# Patient Record
Sex: MALE | Race: WHITE
[De-identification: names, ages, dates, MRNs, and addresses within clinical notes are randomized per-mention and may not be internally consistent; named-entity substitution may affect disease eponyms.]

---

## 2017-12-28 ENCOUNTER — HOSPITAL ENCOUNTER (OUTPATIENT)
Dept: HOSPITAL 72 - GAS | Age: 57
Discharge: HOME | End: 2017-12-28
Payer: COMMERCIAL

## 2017-12-28 VITALS — DIASTOLIC BLOOD PRESSURE: 87 MMHG | SYSTOLIC BLOOD PRESSURE: 132 MMHG

## 2017-12-28 VITALS — DIASTOLIC BLOOD PRESSURE: 85 MMHG | SYSTOLIC BLOOD PRESSURE: 122 MMHG

## 2017-12-28 VITALS — SYSTOLIC BLOOD PRESSURE: 126 MMHG | DIASTOLIC BLOOD PRESSURE: 88 MMHG

## 2017-12-28 VITALS — HEIGHT: 69 IN | BODY MASS INDEX: 29.62 KG/M2 | WEIGHT: 200 LBS

## 2017-12-28 VITALS — SYSTOLIC BLOOD PRESSURE: 116 MMHG | DIASTOLIC BLOOD PRESSURE: 80 MMHG

## 2017-12-28 VITALS — SYSTOLIC BLOOD PRESSURE: 114 MMHG | DIASTOLIC BLOOD PRESSURE: 88 MMHG

## 2017-12-28 VITALS — DIASTOLIC BLOOD PRESSURE: 82 MMHG | SYSTOLIC BLOOD PRESSURE: 126 MMHG

## 2017-12-28 VITALS — SYSTOLIC BLOOD PRESSURE: 128 MMHG | DIASTOLIC BLOOD PRESSURE: 92 MMHG

## 2017-12-28 VITALS — DIASTOLIC BLOOD PRESSURE: 77 MMHG | SYSTOLIC BLOOD PRESSURE: 118 MMHG

## 2017-12-28 DIAGNOSIS — Z90.79: ICD-10-CM

## 2017-12-28 DIAGNOSIS — K29.50: ICD-10-CM

## 2017-12-28 DIAGNOSIS — K21.9: ICD-10-CM

## 2017-12-28 DIAGNOSIS — Z85.46: ICD-10-CM

## 2017-12-28 DIAGNOSIS — I10: ICD-10-CM

## 2017-12-28 DIAGNOSIS — R10.9: Primary | ICD-10-CM

## 2017-12-28 PROCEDURE — 94003 VENT MGMT INPAT SUBQ DAY: CPT

## 2017-12-28 PROCEDURE — 43239 EGD BIOPSY SINGLE/MULTIPLE: CPT

## 2017-12-28 PROCEDURE — 94150 VITAL CAPACITY TEST: CPT

## 2017-12-28 NOTE — 48 HOUR POST ANESTHESIA EVAL
Post Anesthesia Evaluation


Procedure:  EGD


Date of Evaluation:  Dec 28, 2017


Time of Evaluation:  10:04


Blood Pressure Systolic:  131


0:  76


Pulse Rate:  78


Respiratory Rate:  18


Temperature (Fahrenheit):  98


O2 Sat by Pulse Oximetry:  99


Airway:  patent


Nausea:  No


Vomiting:  No


Pain Intensity:  1


Hydration Status:  adequate


Cardiopulmonary Status:


Stable


Mental Status/LOC:  patient returned to baseline


Follow-up Care/Observations:


0


Post-Anesthesia Complications:


0


Follow-up care needed:  ready to discharge











Milton Hackett MD Dec 28, 2017 07:15

## 2017-12-28 NOTE — PRE-PROCEDURE NOTE/ATTESTATION
Pre-Procedure Note/Attestation


Complete Prior to Procedure


Planned Procedure:  not applicable


Procedure Narrative:


EGD





Indications for Procedure


Pre-Operative Diagnosis:


abd pain





Attestation


I attest that I discussed the nature of the procedure; its benefits; risks and 

complications; and alternatives (and the risks and benefits of such alternatives

), prior to the procedure, with the patient (or the patient's legal 

representative).





I attest that, if there was a reasonable possibility of needing a blood 

transfusion, the patient (or the patient's legal representative) was given the 

[facility] standardized written summary, pursuant 

to the Hermann Varinder Blood Safety Act ([place] Health and Safety Code # 1645, as 

amended).





I attest that I re-evaluated the patient just prior to the surgery and that 

there has been no change in the patient's H&P, except as documented below:











THELMA CHUA Dec 28, 2017 07:15

## 2017-12-28 NOTE — BRIEF OPERATIVE NOTE
Immediate Post Operative Note


Operative Note


Chief Complaint:  abd pain


Pre-op Diagnosis:


abd pain


Procedure:


EGD/bx


Post-op Diagnosis:


normal


Surgeon:  ana laura


Anesthesiologist:  see report


Anesthesia:  moderate sedation


Specimen:  yes


Complications:  none


Condition:  stable


Fluids:  recorded


Estimated Blood Loss:  none


Drains:  none


Implant(s) used?:  No











THELMA CHUA Dec 28, 2017 08:48

## 2017-12-28 NOTE — IMMEDIATE POST-OP EVALUATION
Immediate Post-Op Evalulation


Immediate Post-Op Evalulation


Procedure:  EGD


Date of Evaluation:  Dec 28, 2017


Time of Evaluation:  07:59


IV Fluids:  500 LR


Blood Products:  0


Estimated Blood Loss:  4


Urinary Output:  0


Blood Pressure Systolic:  126


Blood Pressure Diastolic:  82


Pulse Rate:  73


Respiratory Rate:  16


O2 Sat by Pulse Oximetry:  99


Temperature (Fahrenheit):  98


Pain Score (1-10):  1


Nausea:  No


Vomiting:  No


Complications


0


Patient Status:  awake, reacts, patent, none


Hydration Status:  adequate











Milton Hackett MD Dec 28, 2017 07:14

## 2017-12-28 NOTE — SHORT STAY SURGERY H&P
History of Present Illness


History of Present Illness


Chief Complaint


See H&P


HPI


Michel Tobar is a 57 year old male who was admitted on  for GERD





Patient History


Allergies:  


Coded Allergies:  


     No Known Allergies (Unverified , 12/28/17)


PAST MEDICAL HISTORY:  


Past Surgeries:  


Social History:  





Medication History


Scheduled


Metoprolol Succinate* (Toprol Xl*), 50 MG ORAL DAILY, (Reported)





Physical Exam


Vital Signs





Last Vital Signs








  Date Time  Temp Pulse Resp B/P (MAP) Pulse Ox O2 Delivery O2 Flow Rate FiO2


 


12/28/17 06:27 97.5 84 18 126/88 99 Room Air  











Plan


Attestation


Are the patient's medical conditions optimized for surgery?











THELMA CHUA Dec 28, 2017 07:16

## 2017-12-28 NOTE — ENDOSCOPY PROCEDURE NOTE
Endoscopy Procedure Note


Indication for Procedure:  abd pain


Procedures Performed:  EGD


Operative Findings/Diagnosis:  normal


Specimen:  yes


Pt Tolerated Procedure Well:  Yes


Estimated Blood Loss:  none


Anesthesiologist:  Roxann


Anesthesia:  MAC, moderate sedation


Medication Given:  see anesthesia record


Implant(s) used?:  No


50 yrs or older w/o bx or poly:  Not Applicable


10yrs. F/U not recommended:  Not Applicable


If not recommended, why?:  











THELMA CHUA Dec 28, 2017 08:47

## 2017-12-28 NOTE — ANETHESIA PREOPERATIVE EVAL
Anesthesia Pre-op PMH/ROS


General


Date of Evaluation:  Dec 28, 2017


Time of Evaluation:  06:56


Anesthesiologist:  Roxann


ASA Score:  ASA 3


Mallampati Score


Class I : Soft palate, uvula, fauces, pillars visible


Class II: Soft palate, uvula, fauces visible


Class III: Soft palate, base of uvula visible


Class IV: Only hard plate visible


Mallampati Classification:  Class II


Surgeon:  Val


Diagnosis:  Abd Pain


Surgical Procedure:  EGD


Anesthesia History:  none


Family History:  no anesthesia problems


Allergies:  


Coded Allergies:  


     No Known Allergies (Unverified , 12/28/17)


Medications:  see eMAR





Past Medical History


Cardiovascular:  Reports: HTN


Gastrointestinal/Genitourinary:  Reports: other - Prostate CA


Hematology/Immune:  Reports: other - Prostate CA


Other:  obesity - BMI 30


PSxH Narrative:


Prostatectomy 2017





Anesthesia Pre-op Phys. Exam


Physician Exam





Last Vital Signs








  Date Time  Temp Pulse Resp B/P (MAP) Pulse Ox O2 Delivery O2 Flow Rate FiO2


 


12/28/17 06:27 97.5 84 18 126/88 99 Room Air  








Constitutional:  NAD


Neurologic:  CN 2-12 intact


Cardiovascular:  RRR


Respiratory:  CTA


Gastrointestinal:  S/NT/ND





Airway Exam


Mallampati Score:  Class I


MO:  full


ROM:  full


Teeth:  intact





Anesthesia Pre-op A/P


Risk Assessment & Plan


Assessment:


ASA 3


Plan:


GA


Status Change Before Surgery:  Milton Ramos MD Dec 28, 2017 07:03

## 2017-12-29 NOTE — OPERATIVE NOTE - DICTATED
DATE OF OPERATION:  12/28/2017



PROCEDURE:  Upper gastrointestinal endoscopy with biopsy.



SURGEON:  Marlyn Neal M.D.



ANESTHESIA:  Please see the separate anesthesiologist notes for details.



PRE-ENDOSCOPIC DIAGNOSIS:  Abdominal pain.



POST-ENDOSCOPIC DIAGNOSES:

1. Mild linear erosion in the lower esophagus suggestive of mild

gastroesophageal reflux disease.

2. Mild bile reflux into the gastric lumen.

3. Status post random biopsies of the duodenum, antrum, fundus, and

lower esophagus.



DESCRIPTION OF PROCEDURE:  The procedure, its risks, indications,

alternatives, and possible complications were explained to the patient and

informed consent was obtained.  The patient was then sedated in the left

lateral decubitus position.  A diagnostic upper endoscope was introduced

through the oropharynx and advanced to the duodenum.  Findings were as

listed above.  The endoscope was removed and the patient was sent to

recovery in good condition.



COMPLICATIONS:  None.



RECOMMENDATIONS:

1. Follow up biopsy results.

2. Trial of Prilosec 40 mg daily.

3. Outpatient followup.

4. A CT scan of the abdomen and pelvis if symptoms persist.









  ______________________________________________

  Marlyn Neal M.D.





DR:  GONZALES

D:  12/28/2017 19:59

T:  12/28/2017 20:22

JOB#:  [id number]

CC:  Marlyn Neal M.D.; Fax#:  [phone]

## 2018-03-19 ENCOUNTER — HOSPITAL ENCOUNTER (OUTPATIENT)
Dept: HOSPITAL 72 - GAS | Age: 58
Discharge: HOME | End: 2018-03-19
Payer: COMMERCIAL

## 2018-03-19 VITALS — SYSTOLIC BLOOD PRESSURE: 112 MMHG | DIASTOLIC BLOOD PRESSURE: 71 MMHG

## 2018-03-19 VITALS — HEIGHT: 69 IN | WEIGHT: 196 LBS | BODY MASS INDEX: 29.03 KG/M2

## 2018-03-19 VITALS — DIASTOLIC BLOOD PRESSURE: 79 MMHG | SYSTOLIC BLOOD PRESSURE: 112 MMHG

## 2018-03-19 VITALS — DIASTOLIC BLOOD PRESSURE: 82 MMHG | SYSTOLIC BLOOD PRESSURE: 129 MMHG

## 2018-03-19 VITALS — SYSTOLIC BLOOD PRESSURE: 112 MMHG | DIASTOLIC BLOOD PRESSURE: 79 MMHG

## 2018-03-19 VITALS — DIASTOLIC BLOOD PRESSURE: 80 MMHG | SYSTOLIC BLOOD PRESSURE: 107 MMHG

## 2018-03-19 VITALS — SYSTOLIC BLOOD PRESSURE: 125 MMHG | DIASTOLIC BLOOD PRESSURE: 78 MMHG

## 2018-03-19 VITALS — DIASTOLIC BLOOD PRESSURE: 80 MMHG | SYSTOLIC BLOOD PRESSURE: 115 MMHG

## 2018-03-19 DIAGNOSIS — Z85.46: ICD-10-CM

## 2018-03-19 DIAGNOSIS — K63.5: Primary | ICD-10-CM

## 2018-03-19 DIAGNOSIS — I10: ICD-10-CM

## 2018-03-19 DIAGNOSIS — Z87.891: ICD-10-CM

## 2018-03-19 PROCEDURE — 94003 VENT MGMT INPAT SUBQ DAY: CPT

## 2018-03-19 PROCEDURE — 94150 VITAL CAPACITY TEST: CPT

## 2018-03-19 PROCEDURE — 45380 COLONOSCOPY AND BIOPSY: CPT

## 2018-03-19 NOTE — IMMEDIATE POST-OP EVALUATION
Immediate Post-Op Evalulation


Immediate Post-Op Evalulation


Procedure:  colonoscopy


Date of Evaluation:  Mar 19, 2018


Time of Evaluation:  08:00


IV Fluids:  300ml lr


Blood Products:  none


Estimated Blood Loss:  negligible


Blood Pressure Systolic:  125


Blood Pressure Diastolic:  78


Pulse Rate:  74


Respiratory Rate:  18


O2 Sat by Pulse Oximetry:  99


Temperature (Fahrenheit):  97.5


Pain Score (1-10):  0


Nausea:  No


Vomiting:  No


Complications


none


Patient Status:  awake, reacts, patent


Hydration Status:  adequate


Drug:  MARY Garnica Mar 19, 2018 09:14

## 2018-03-19 NOTE — ENDOSCOPY PROCEDURE NOTE
Endoscopy Procedure Note


General


Indication for Procedure:  abd pain


Procedures Performed:  colonoscopy


Operative Findings/Diagnosis:  dim sig polyp at 15 x 2


Specimen:  yes


Pt Tolerated Procedure Well:  Yes


Estimated Blood Loss:  none





Anesthesia


Anesthesiologist:  Soy


Anesthesia:  MAC





Medications


Medication Given:  see anesthesia record





Inserted Devices


Implant(s) used?:  No





GI Core Measures


50 yrs or older w/o bx or poly:  Not Applicable


10yrs. F/U not recommended:  Not Applicable


If not recommended, why?:  











THELMA CHUA Mar 19, 2018 07:50

## 2018-03-19 NOTE — PRE-PROCEDURE NOTE/ATTESTATION
Pre-Procedure Note/Attestation


Complete Prior to Procedure


Planned Procedure:  not applicable


Procedure Narrative:


colon, bx





Indications for Procedure


Pre-Operative Diagnosis:


abd pain





Attestation


I attest that I discussed the nature of the procedure; its benefits; risks and 

complications; and alternatives (and the risks and benefits of such alternatives

), prior to the procedure, with the patient (or the patient's legal 

representative).





I attest that, if there was a reasonable possibility of needing a blood 

transfusion, the patient (or the patient's legal representative) was given the 

[facility] standardized written summary, pursuant 

to the Hermann Varinder Blood Safety Act ([place] Health and Safety Code # 1645, as 

amended).





I attest that I re-evaluated the patient just prior to the surgery and that 

there has been no change in the patient's H&P, except as documented below:











THELMA CHUA Mar 19, 2018 07:05

## 2018-03-19 NOTE — ANETHESIA PREOPERATIVE EVAL
Anesthesia Pre-op PMH/ROS


General


Date of Evaluation:  Mar 19, 2018


Time of Evaluation:  06:49


Anesthesiologist:  lawrence


ASA Score:  ASA 3


Mallampati Score


Class I : Soft palate, uvula, fauces, pillars visible


Class II: Soft palate, uvula, fauces visible


Class III: Soft palate, base of uvula visible


Class IV: Only hard plate visible


Mallampati Classification:  Class II


Surgeon:  ana laura


Diagnosis:  abdominal pain


Surgical Procedure:  colonoscopy


Anesthesia History:  none


Social History:  smoking - former smoker


Allergies:  


Coded Allergies:  


     No Known Allergies (Unverified , 12/28/17)


Medications:  see eMAR





Past Medical History


Cardiovascular:  Reports: HTN


Gastrointestinal/Genitourinary:  Reports: other - abdominal pain, prostate 

cancer





Anesthesia Pre-op Phys. Exam


Physician Exam





Last Vital Signs








  Date Time  Temp Pulse Resp B/P (MAP) Pulse Ox O2 Delivery O2 Flow Rate FiO2


 


3/19/18 06:44 97.7 93 20 129/82 96 Room Air  





 97.7       








Constitutional:  NAD


Neurologic:  CN 2-12 intact


Cardiovascular:  RRR


Respiratory:  CTA


Gastrointestinal:  S/NT/ND





Airway Exam


Mallampati Score:  Class II


MO:  full


Neck:  supple


TMD:  2fb


ROM:  full


Teeth:  intact





Anesthesia Pre-op A/P


Risk Assessment & Plan


Assessment:


asa3


Plan:


mac


Status Change Before Surgery:  No





Pre-Antibiotics


Drug:  MARY Garnica Mar 19, 2018 06:51

## 2018-03-19 NOTE — BRIEF OPERATIVE NOTE
Immediate Post Operative Note


Operative Note


Chief Complaint:  abd pain


Pre-op Diagnosis:


abd pain


Procedure:


colon , bx


Surgeon:  ana laura


Anesthesiologist:  David kaplan


Anesthesia:  MAC


Specimen:  yes


Complications:  none


Condition:  stable


Fluids:  recorded


Estimated Blood Loss:  none


Drains:  none


Implant(s) used?:  No











THELMA CHUA Mar 19, 2018 07:51

## 2018-03-19 NOTE — 48 HOUR POST ANESTHESIA EVAL
Post Anesthesia Evaluation


Procedure:  colonoscopy


Date of Evaluation:  Mar 19, 2018


Time of Evaluation:  08:02


Blood Pressure Systolic:  112


0:  79


Pulse Rate:  69


Respiratory Rate:  18


Temperature (Fahrenheit):  97.5


O2 Sat by Pulse Oximetry:  100


Airway:  patent


Nausea:  No


Vomiting:  No


Pain Intensity:  0


Hydration Status:  adequate


Cardiopulmonary Status:


stable


Mental Status/LOC:  patient returned to baseline


Post-Anesthesia Complications:


none


Follow-up care needed:  N/A











MARY MORRIS Mar 19, 2018 09:15

## 2018-03-19 NOTE — SHORT STAY SURGERY H&P
History of Present Illness


History of Present Illness


Chief Complaint


Please see typed H&P


HPI


Michel Tobar is a 57 year old male who was admitted on  for Abdominal Pain





Patient History


Allergies:  


Coded Allergies:  


     No Known Allergies (Unverified , 12/28/17)


PAST MEDICAL HISTORY:  


Past Surgeries:  


Social History:  





Medication History


Scheduled


Metoprolol Succinate* (Toprol Xl*), 50 MG ORAL DAILY, (Reported)





Physical Exam


Vital Signs





Last Vital Signs








  Date Time  Temp Pulse Resp B/P (MAP) Pulse Ox O2 Delivery O2 Flow Rate FiO2


 


3/19/18 06:44 97.7 93 20 129/82 96 Room Air  





 97.7       











Plan


Attestation


Are the patient's medical conditions optimized for surgery?











THELMA CHUA Mar 19, 2018 07:06

## 2018-03-19 NOTE — PROCEDURE NOTE
DATE OF PROCEDURE:  03/19/2018



GASTROENTEROLOGY PROCEDURE REPORT



PROCEDURE:  Colonoscopy with biopsy.



SURGEON:  Marlyn Neal M.D.



ANESTHESIA:  Please see the separate anesthesiologist notes for details.



PRE-ENDOSCOPIC DIAGNOSIS:  Intractable abdominal pain.



POST-ENDOSCOPIC DIAGNOSES:

1. Normal terminal ileum, status post biopsy.

2. Two diminutive colon polyps measuring approximately 3 to 4 mm in the

sigmoid colon at 15 cm, status post biopsy.

3. Status post random biopsy of the right colon, left colon, and

rectosigmoid colon.

4. There was no evidence of ulcerations or inflammatory changes that

could explain the patient's abdominal pain.



PROCEDURE: 

A rectal exam was done which was normal.  The colonoscope was introduced and 
advanced to terminal ileum.  The colonoscope was then gradually withdrawn and 
the mucosa were examined carefully.  Exam was concluded with retroflex view of 
rectum.   The exam findings are listed above.



RECOMMENDATIONS:

1. Follow up biopsy results.

2. Outpatient followup.









  ______________________________________________

  Marlyn Neal M.D.





DR:  BANDAR

D:  03/19/2018 07:56

T:  03/19/2018 10:18

JOB#:  [id number]

CC:  Marlyn Neal M.D.; Fax#:  [phone]



MTDD